# Patient Record
Sex: FEMALE | Race: WHITE | URBAN - METROPOLITAN AREA
[De-identification: names, ages, dates, MRNs, and addresses within clinical notes are randomized per-mention and may not be internally consistent; named-entity substitution may affect disease eponyms.]

---

## 2017-07-31 NOTE — PATIENT DISCUSSION
(H25.13) Age-related nuclear cataract, bilateral - Assesment : Examination revealed cataract. - Plan : Monitor for changes. Advised patient to call our office with decreased vision or increased symptoms. Updated GLRx given today. RTC in 1 year for Exam, sooner if problems or changes occur.

## 2017-07-31 NOTE — PATIENT DISCUSSION
(H43.313) Vitreous membranes and strands, bilateral - Assesment : Examination revealed vitreous degeneration. - Plan : Handouts given on posterior vitreous detachment and risk factors discussed for retinal detachment development. Advised to call immediately with any changes.

## 2017-07-31 NOTE — PATIENT DISCUSSION
(Z38.401) Keratoconjunct sicca, not specified as Sjogren's, bilateral - Assesment : Examination revealed Dry Eye Syndrome - Plan : Monitor for changes.  ATs prn

## 2021-03-09 ENCOUNTER — IMPORTED ENCOUNTER (OUTPATIENT)
Dept: URBAN - METROPOLITAN AREA CLINIC 9 | Facility: CLINIC | Age: 71
End: 2021-03-09

## 2021-04-27 ENCOUNTER — IMPORTED ENCOUNTER (OUTPATIENT)
Dept: URBAN - METROPOLITAN AREA CLINIC 9 | Facility: CLINIC | Age: 71
End: 2021-04-27

## 2021-04-29 ENCOUNTER — IMPORTED ENCOUNTER (OUTPATIENT)
Dept: URBAN - METROPOLITAN AREA CLINIC 9 | Facility: CLINIC | Age: 71
End: 2021-04-29

## 2021-05-17 ENCOUNTER — IMPORTED ENCOUNTER (OUTPATIENT)
Dept: URBAN - METROPOLITAN AREA CLINIC 9 | Facility: CLINIC | Age: 71
End: 2021-05-17

## 2021-05-24 ENCOUNTER — IMPORTED ENCOUNTER (OUTPATIENT)
Dept: URBAN - METROPOLITAN AREA CLINIC 9 | Facility: CLINIC | Age: 71
End: 2021-05-24

## 2021-06-11 ENCOUNTER — IMPORTED ENCOUNTER (OUTPATIENT)
Dept: URBAN - METROPOLITAN AREA CLINIC 9 | Facility: CLINIC | Age: 71
End: 2021-06-11

## 2021-10-16 ASSESSMENT — VISUAL ACUITY
OD_CC: 20/30 SN
OS_CC: 20/25 + SN
OS_SC: 20/30 + SN
OS_SC: 20/200 SN
OD_SC: 20/400 SN
OS_SC: 20/20 SN
OS_CC: 20/30 +2 SN
OS_SC: 20/25 - SN
OS_CC: 20/30 SN
OD_SC: 20/60 SN
OD_CC: 20/30 SN
OS_SC: 20/40 SN
OD_SC: 20/40 SN
OD_CC: 20/25 SN
OD_CC: 20/30 + SN
OS_CC: 20/20 SN
OS_SC: 20/30 SN
OD_CC: 20/25 - SN
OD_SC: 20/100 SN
OD_SC: 20/30 - SN

## 2021-10-16 ASSESSMENT — KERATOMETRY
OS_K1POWER_DIOPTERS: 43
OD_AXISANGLE2_DEGREES: 147
OD_K1POWER_DIOPTERS: 43
OS_AXISANGLE_DEGREES: 116
OS_AXISANGLE2_DEGREES: 26
OD_K2POWER_DIOPTERS: 44
OD_AXISANGLE_DEGREES: 57
OS_K2POWER_DIOPTERS: 44

## 2021-10-16 ASSESSMENT — TONOMETRY
OD_IOP_MMHG: 17
OD_IOP_MMHG: 9
OS_IOP_MMHG: 7
OS_IOP_MMHG: 8
OS_IOP_MMHG: 19

## 2022-05-27 ENCOUNTER — ESTABLISHED PATIENT (OUTPATIENT)
Dept: URBAN - METROPOLITAN AREA CLINIC 10 | Facility: CLINIC | Age: 72
End: 2022-05-27

## 2022-05-27 DIAGNOSIS — Z96.1: ICD-10-CM

## 2022-05-27 DIAGNOSIS — H52.03: ICD-10-CM

## 2022-05-27 PROCEDURE — 92015 DETERMINE REFRACTIVE STATE: CPT

## 2022-05-27 PROCEDURE — 92014 COMPRE OPH EXAM EST PT 1/>: CPT

## 2022-05-27 ASSESSMENT — TONOMETRY
OD_IOP_MMHG: 16
OS_IOP_MMHG: 16

## 2022-05-27 ASSESSMENT — KERATOMETRY
OD_AXISANGLE2_DEGREES: 105
OD_AXISANGLE_DEGREES: 15
OD_K2POWER_DIOPTERS: 45.25
OD_K1POWER_DIOPTERS: 43.75
OS_K2POWER_DIOPTERS: 45.00
OS_AXISANGLE_DEGREES: 150
OS_K1POWER_DIOPTERS: 43.25
OS_AXISANGLE2_DEGREES: 60

## 2022-05-27 ASSESSMENT — VISUAL ACUITY
OS_SC: 20/20
OD_SC: 20/40-1
OU_SC: 20/20

## 2022-06-29 RX ORDER — TRAZODONE HYDROCHLORIDE 50 MG/1
TABLET ORAL
COMMUNITY
Start: 2021-08-12 | End: 2022-08-15 | Stop reason: SDUPTHER

## 2022-06-29 RX ORDER — SIMVASTATIN 20 MG
TABLET ORAL
COMMUNITY
End: 2022-08-15 | Stop reason: SDUPTHER

## 2022-06-29 RX ORDER — ATENOLOL 50 MG/1
TABLET ORAL
COMMUNITY
End: 2022-08-15 | Stop reason: SDUPTHER

## 2022-06-29 RX ORDER — LOSARTAN POTASSIUM 25 MG/1
TABLET ORAL
COMMUNITY
End: 2022-08-15 | Stop reason: SDUPTHER

## 2022-06-29 RX ORDER — DOCUSATE SODIUM 100 MG/1
CAPSULE, LIQUID FILLED ORAL
COMMUNITY
End: 2022-08-15 | Stop reason: SDUPTHER

## 2022-06-29 RX ORDER — FLUCONAZOLE 150 MG/1
TABLET ORAL
COMMUNITY
Start: 2021-12-17

## 2022-06-29 RX ORDER — WARFARIN SODIUM 2.5 MG/1
TABLET ORAL
COMMUNITY
End: 2022-08-15 | Stop reason: SDUPTHER

## 2022-06-29 RX ORDER — TRAMADOL HYDROCHLORIDE 50 MG/1
TABLET ORAL
COMMUNITY
Start: 2021-12-17

## 2022-06-29 RX ORDER — SUMATRIPTAN 100 MG/1
TABLET, FILM COATED ORAL
COMMUNITY
End: 2022-08-15 | Stop reason: SDUPTHER

## 2022-06-29 RX ORDER — DENOSUMAB 60 MG/ML
INJECTION SUBCUTANEOUS
COMMUNITY

## 2022-07-22 PROBLEM — I71.20 THORACIC AORTIC ANEURYSM WITHOUT RUPTURE: Status: ACTIVE | Noted: 2022-07-22

## 2022-07-22 PROBLEM — M81.0 AGE RELATED OSTEOPOROSIS: Status: ACTIVE | Noted: 2022-07-22

## 2022-07-22 PROBLEM — G43.909 MIGRAINE: Status: ACTIVE | Noted: 2022-07-22

## 2022-07-22 PROBLEM — Z95.2 H/O AORTIC VALVE REPLACEMENT: Status: ACTIVE | Noted: 2022-07-22

## 2022-07-22 PROBLEM — I50.20 HFREF (HEART FAILURE WITH REDUCED EJECTION FRACTION) (HCC): Status: ACTIVE | Noted: 2022-07-22

## 2022-07-22 PROBLEM — G47.00 INSOMNIA: Status: ACTIVE | Noted: 2022-07-22

## 2022-07-22 PROBLEM — I10 ESSENTIAL HYPERTENSION: Status: ACTIVE | Noted: 2022-07-22

## 2022-07-22 PROBLEM — K76.89 HEPATIC CYST: Status: ACTIVE | Noted: 2022-07-22

## 2022-07-22 PROBLEM — E55.9 VITAMIN D DEFICIENCY: Status: ACTIVE | Noted: 2022-07-22

## 2022-07-22 PROBLEM — E78.2 MIXED DYSLIPIDEMIA: Status: ACTIVE | Noted: 2022-07-22

## 2022-08-15 PROBLEM — M25.061 HEMARTHROSIS OF RIGHT KNEE: Status: ACTIVE | Noted: 2022-08-15

## 2022-08-15 PROBLEM — R79.89 ABNORMAL SERUM THYROID STIMULATING HORMONE (TSH) LEVEL: Status: ACTIVE | Noted: 2022-08-15

## 2022-08-15 PROBLEM — M17.11 PRIMARY OSTEOARTHRITIS OF RIGHT KNEE: Status: ACTIVE | Noted: 2022-08-15

## 2022-08-15 PROBLEM — M11.261 CHONDROCALCINOSIS OF RIGHT KNEE: Status: ACTIVE | Noted: 2022-08-15

## 2022-08-23 ENCOUNTER — ESTABLISHED PATIENT (OUTPATIENT)
Dept: URBAN - METROPOLITAN AREA CLINIC 14 | Facility: CLINIC | Age: 72
End: 2022-08-23

## 2022-08-23 DIAGNOSIS — D23.112: ICD-10-CM

## 2022-08-23 PROCEDURE — 67840 REMOVE EYELID LESION: CPT

## 2022-08-23 PROCEDURE — 92012 INTRM OPH EXAM EST PATIENT: CPT

## 2022-08-23 PROCEDURE — 92285 EXTERNAL OCULAR PHOTOGRAPHY: CPT

## 2022-08-23 ASSESSMENT — VISUAL ACUITY
OS_SC: 20/20
OD_SC: 20/40-1

## 2022-09-29 PROBLEM — N39.0 URINARY TRACT INFECTION: Status: ACTIVE | Noted: 2022-09-29

## 2022-09-29 PROBLEM — R26.2 DISABILITY OF WALKING: Status: ACTIVE | Noted: 2022-09-29

## 2022-10-29 PROBLEM — N39.0 URINARY TRACT INFECTION: Status: RESOLVED | Noted: 2022-09-29 | Resolved: 2022-10-29

## 2023-05-30 ENCOUNTER — ESTABLISHED PATIENT (OUTPATIENT)
Facility: LOCATION | Age: 73
End: 2023-05-30

## 2023-05-30 DIAGNOSIS — H52.03: ICD-10-CM

## 2023-05-30 DIAGNOSIS — Z96.1: ICD-10-CM

## 2023-05-30 PROCEDURE — 92015 DETERMINE REFRACTIVE STATE: CPT

## 2023-05-30 PROCEDURE — 92014 COMPRE OPH EXAM EST PT 1/>: CPT

## 2023-05-30 ASSESSMENT — TONOMETRY
OD_IOP_MMHG: 16
OS_IOP_MMHG: 16

## 2023-05-30 ASSESSMENT — KERATOMETRY
OS_AXISANGLE_DEGREES: 150
OS_K2POWER_DIOPTERS: 46.25
OD_AXISANGLE_DEGREES: 12
OS_AXISANGLE2_DEGREES: 60
OD_AXISANGLE2_DEGREES: 102
OD_K2POWER_DIOPTERS: 46.00
OS_K1POWER_DIOPTERS: 43.25
OD_K1POWER_DIOPTERS: 44.00

## 2023-05-30 ASSESSMENT — VISUAL ACUITY
OD_SC: 20/30
OS_SC: 20/20
OU_SC: 20/25-2

## 2024-05-31 ENCOUNTER — ESTABLISHED PATIENT (OUTPATIENT)
Facility: LOCATION | Age: 74
End: 2024-05-31

## 2024-05-31 DIAGNOSIS — Z96.1: ICD-10-CM

## 2024-05-31 DIAGNOSIS — H52.03: ICD-10-CM

## 2024-05-31 PROCEDURE — 92015 DETERMINE REFRACTIVE STATE: CPT

## 2024-05-31 PROCEDURE — 92014 COMPRE OPH EXAM EST PT 1/>: CPT

## 2024-05-31 ASSESSMENT — VISUAL ACUITY
OU_SC: 20/30
OS_SC: 20/40-1
OD_SC: 20/30

## 2024-05-31 ASSESSMENT — KERATOMETRY
OD_AXISANGLE_DEGREES: 21
OS_K1POWER_DIOPTERS: 43.25
OD_AXISANGLE2_DEGREES: 111
OS_K2POWER_DIOPTERS: 46.00
OS_AXISANGLE_DEGREES: 152
OD_K2POWER_DIOPTERS: 46.00
OD_K1POWER_DIOPTERS: 43.75
OS_AXISANGLE2_DEGREES: 62

## 2024-05-31 ASSESSMENT — TONOMETRY
OS_IOP_MMHG: 16
OD_IOP_MMHG: 16

## 2024-06-11 ENCOUNTER — ESTABLISHED PATIENT (OUTPATIENT)
Facility: LOCATION | Age: 74
End: 2024-06-11

## 2024-06-11 DIAGNOSIS — H26.493: ICD-10-CM

## 2024-06-11 PROCEDURE — 92014 COMPRE OPH EXAM EST PT 1/>: CPT

## 2024-06-11 ASSESSMENT — KERATOMETRY
OD_K2POWER_DIOPTERS: 46.00
OS_K1POWER_DIOPTERS: 43.25
OD_AXISANGLE_DEGREES: 21
OD_K1POWER_DIOPTERS: 43.75
OS_AXISANGLE_DEGREES: 152
OS_K2POWER_DIOPTERS: 46.00
OD_AXISANGLE2_DEGREES: 111
OS_AXISANGLE2_DEGREES: 62

## 2024-06-11 ASSESSMENT — TONOMETRY
OD_IOP_MMHG: 13
OS_IOP_MMHG: 11

## 2024-06-11 ASSESSMENT — VISUAL ACUITY
OD_BCVA: 20/25-2
OS_SC: 20/70
OD_SC: 20/50
OS_BCVA: 20/30

## 2024-06-26 ENCOUNTER — SURGERY/PROCEDURE (OUTPATIENT)
Dept: URBAN - METROPOLITAN AREA SURGERY 6 | Facility: SURGERY | Age: 74
End: 2024-06-26

## 2024-06-26 DIAGNOSIS — H26.493: ICD-10-CM

## 2024-06-26 PROCEDURE — 66821 AFTER CATARACT LASER SURGERY: CPT

## 2024-06-28 ASSESSMENT — KERATOMETRY
OS_AXISANGLE2_DEGREES: 62
OS_K2POWER_DIOPTERS: 46.00
OD_AXISANGLE_DEGREES: 21
OS_K1POWER_DIOPTERS: 43.25
OS_AXISANGLE_DEGREES: 152
OD_K1POWER_DIOPTERS: 43.75
OD_K2POWER_DIOPTERS: 46.00
OD_AXISANGLE2_DEGREES: 111

## 2024-07-03 ENCOUNTER — SURGERY/PROCEDURE (OUTPATIENT)
Dept: URBAN - METROPOLITAN AREA SURGERY 6 | Facility: SURGERY | Age: 74
End: 2024-07-03

## 2024-07-03 DIAGNOSIS — H26.493: ICD-10-CM

## 2024-07-03 PROCEDURE — 66821 AFTER CATARACT LASER SURGERY: CPT | Mod: 79,RT

## 2024-07-09 ASSESSMENT — KERATOMETRY
OS_AXISANGLE2_DEGREES: 62
OD_K2POWER_DIOPTERS: 46.00
OS_K2POWER_DIOPTERS: 46.00
OD_K1POWER_DIOPTERS: 43.75
OS_K1POWER_DIOPTERS: 43.25
OD_AXISANGLE2_DEGREES: 111
OS_AXISANGLE_DEGREES: 152
OD_AXISANGLE_DEGREES: 21

## 2024-07-30 ENCOUNTER — POST-OP (OUTPATIENT)
Facility: LOCATION | Age: 74
End: 2024-07-30

## 2024-07-30 DIAGNOSIS — Z96.1: ICD-10-CM

## 2024-07-30 ASSESSMENT — VISUAL ACUITY
OU_SC: 20/25
OD_SC: 20/25-1
OS_SC: 20/30

## 2024-07-30 ASSESSMENT — KERATOMETRY
OS_AXISANGLE_DEGREES: 152
OS_K2POWER_DIOPTERS: 46.00
OD_K2POWER_DIOPTERS: 46.00
OD_K1POWER_DIOPTERS: 43.75
OD_AXISANGLE2_DEGREES: 111
OS_AXISANGLE2_DEGREES: 62
OS_K1POWER_DIOPTERS: 43.25
OD_AXISANGLE_DEGREES: 21

## 2025-06-03 ENCOUNTER — COMPREHENSIVE EXAM (OUTPATIENT)
Age: 75
End: 2025-06-03

## 2025-06-03 DIAGNOSIS — H26.493: ICD-10-CM

## 2025-06-03 DIAGNOSIS — H52.03: ICD-10-CM

## 2025-06-03 DIAGNOSIS — Z96.1: ICD-10-CM

## 2025-06-03 PROCEDURE — 92015 DETERMINE REFRACTIVE STATE: CPT

## 2025-06-03 PROCEDURE — 92014 COMPRE OPH EXAM EST PT 1/>: CPT
